# Patient Record
Sex: FEMALE | URBAN - METROPOLITAN AREA
[De-identification: names, ages, dates, MRNs, and addresses within clinical notes are randomized per-mention and may not be internally consistent; named-entity substitution may affect disease eponyms.]

---

## 2019-07-10 ENCOUNTER — APPOINTMENT (RX ONLY)
Dept: URBAN - METROPOLITAN AREA CLINIC 4 | Facility: CLINIC | Age: 74
Setting detail: DERMATOLOGY
End: 2019-07-10

## 2019-07-10 DIAGNOSIS — L82.1 OTHER SEBORRHEIC KERATOSIS: ICD-10-CM

## 2019-07-10 DIAGNOSIS — L81.4 OTHER MELANIN HYPERPIGMENTATION: ICD-10-CM

## 2019-07-10 DIAGNOSIS — Z71.89 OTHER SPECIFIED COUNSELING: ICD-10-CM

## 2019-07-10 DIAGNOSIS — D22 MELANOCYTIC NEVI: ICD-10-CM

## 2019-07-10 DIAGNOSIS — D18.0 HEMANGIOMA: ICD-10-CM

## 2019-07-10 PROBLEM — D18.01 HEMANGIOMA OF SKIN AND SUBCUTANEOUS TISSUE: Status: ACTIVE | Noted: 2019-07-10

## 2019-07-10 PROBLEM — D22.39 MELANOCYTIC NEVI OF OTHER PARTS OF FACE: Status: ACTIVE | Noted: 2019-07-10

## 2019-07-10 PROBLEM — D22.62 MELANOCYTIC NEVI OF LEFT UPPER LIMB, INCLUDING SHOULDER: Status: ACTIVE | Noted: 2019-07-10

## 2019-07-10 PROBLEM — D22.71 MELANOCYTIC NEVI OF RIGHT LOWER LIMB, INCLUDING HIP: Status: ACTIVE | Noted: 2019-07-10

## 2019-07-10 PROBLEM — D22.61 MELANOCYTIC NEVI OF RIGHT UPPER LIMB, INCLUDING SHOULDER: Status: ACTIVE | Noted: 2019-07-10

## 2019-07-10 PROBLEM — D22.5 MELANOCYTIC NEVI OF TRUNK: Status: ACTIVE | Noted: 2019-07-10

## 2019-07-10 PROBLEM — D22.72 MELANOCYTIC NEVI OF LEFT LOWER LIMB, INCLUDING HIP: Status: ACTIVE | Noted: 2019-07-10

## 2019-07-10 PROCEDURE — ? ADDITIONAL NOTES

## 2019-07-10 PROCEDURE — 99203 OFFICE O/P NEW LOW 30 MIN: CPT

## 2019-07-10 PROCEDURE — ? COUNSELING

## 2019-07-10 ASSESSMENT — LOCATION SIMPLE DESCRIPTION DERM
LOCATION SIMPLE: LEFT UPPER ARM
LOCATION SIMPLE: LEFT BREAST
LOCATION SIMPLE: UPPER BACK
LOCATION SIMPLE: RIGHT THIGH
LOCATION SIMPLE: LEFT UPPER BACK
LOCATION SIMPLE: LEFT TEMPLE
LOCATION SIMPLE: LEFT THIGH
LOCATION SIMPLE: LEFT FOREHEAD
LOCATION SIMPLE: CHEST
LOCATION SIMPLE: ABDOMEN
LOCATION SIMPLE: RIGHT UPPER ARM
LOCATION SIMPLE: RIGHT POSTERIOR THIGH

## 2019-07-10 ASSESSMENT — LOCATION DETAILED DESCRIPTION DERM
LOCATION DETAILED: LEFT LATERAL BREAST 3-4:00 REGION
LOCATION DETAILED: RIGHT ANTERIOR PROXIMAL UPPER ARM
LOCATION DETAILED: RIGHT ANTERIOR DISTAL UPPER ARM
LOCATION DETAILED: LEFT ANTERIOR PROXIMAL THIGH
LOCATION DETAILED: UPPER STERNUM
LOCATION DETAILED: LEFT MEDIAL FOREHEAD
LOCATION DETAILED: LEFT MEDIAL UPPER BACK
LOCATION DETAILED: LEFT SUPERIOR MEDIAL UPPER BACK
LOCATION DETAILED: RIGHT DISTAL POSTERIOR THIGH
LOCATION DETAILED: LOWER STERNUM
LOCATION DETAILED: LEFT CENTRAL TEMPLE
LOCATION DETAILED: INFERIOR THORACIC SPINE
LOCATION DETAILED: EPIGASTRIC SKIN
LOCATION DETAILED: RIGHT ANTERIOR DISTAL THIGH
LOCATION DETAILED: RIGHT ANTERIOR PROXIMAL THIGH
LOCATION DETAILED: MIDDLE STERNUM
LOCATION DETAILED: LEFT ANTERIOR PROXIMAL UPPER ARM
LOCATION DETAILED: SUPERIOR THORACIC SPINE
LOCATION DETAILED: LEFT ANTERIOR DISTAL THIGH
LOCATION DETAILED: LEFT ANTERIOR DISTAL UPPER ARM
LOCATION DETAILED: LEFT INFERIOR MEDIAL FOREHEAD

## 2019-07-10 ASSESSMENT — LOCATION ZONE DERM
LOCATION ZONE: TRUNK
LOCATION ZONE: ARM
LOCATION ZONE: FACE
LOCATION ZONE: LEG

## 2019-07-10 NOTE — PROCEDURE: ADDITIONAL NOTES
Detail Level: Zone
Additional Notes: Lesion of concern on the left cheek, clinically consistent with a seborrheic keratosis.
Additional Notes: Lesion of concern on the left breast, clinically consistent with a seborrheic keratosis.
Additional Notes: Lesion of concern on the leg, clinically consistent with a seborrheic keratosis.

## 2021-01-15 DIAGNOSIS — Z23 NEED FOR VACCINATION: ICD-10-CM

## 2022-09-12 ENCOUNTER — APPOINTMENT (RX ONLY)
Dept: URBAN - METROPOLITAN AREA CLINIC 4 | Facility: CLINIC | Age: 77
Setting detail: DERMATOLOGY
End: 2022-09-12

## 2022-09-12 DIAGNOSIS — L81.4 OTHER MELANIN HYPERPIGMENTATION: ICD-10-CM

## 2022-09-12 DIAGNOSIS — L72.0 EPIDERMAL CYST: ICD-10-CM

## 2022-09-12 DIAGNOSIS — L91.8 OTHER HYPERTROPHIC DISORDERS OF THE SKIN: ICD-10-CM

## 2022-09-12 DIAGNOSIS — L57.0 ACTINIC KERATOSIS: ICD-10-CM

## 2022-09-12 DIAGNOSIS — Z71.89 OTHER SPECIFIED COUNSELING: ICD-10-CM

## 2022-09-12 PROBLEM — D48.5 NEOPLASM OF UNCERTAIN BEHAVIOR OF SKIN: Status: ACTIVE | Noted: 2022-09-12

## 2022-09-12 PROCEDURE — ? SKIN TAG REMOVAL (COSMETIC)

## 2022-09-12 PROCEDURE — ? OBSERVATION

## 2022-09-12 PROCEDURE — ? BIOPSY BY SHAVE METHOD

## 2022-09-12 PROCEDURE — ? PHOTO-DOCUMENTATION

## 2022-09-12 PROCEDURE — 11102 TANGNTL BX SKIN SINGLE LES: CPT

## 2022-09-12 PROCEDURE — 99203 OFFICE O/P NEW LOW 30 MIN: CPT | Mod: 25

## 2022-09-12 PROCEDURE — ? EXTRACTIONS

## 2022-09-12 PROCEDURE — ? COUNSELING

## 2022-09-12 ASSESSMENT — LOCATION DETAILED DESCRIPTION DERM
LOCATION DETAILED: LEFT INFERIOR MEDIAL MALAR CHEEK
LOCATION DETAILED: RIGHT AXILLARY VAULT
LOCATION DETAILED: RIGHT CENTRAL ZYGOMA
LOCATION DETAILED: LEFT SUPERIOR MEDIAL FOREHEAD
LOCATION DETAILED: LEFT MEDIAL BREAST 9-10:00 REGION

## 2022-09-12 ASSESSMENT — LOCATION SIMPLE DESCRIPTION DERM
LOCATION SIMPLE: LEFT BREAST
LOCATION SIMPLE: RIGHT AXILLARY VAULT
LOCATION SIMPLE: RIGHT ZYGOMA
LOCATION SIMPLE: LEFT FOREHEAD
LOCATION SIMPLE: LEFT CHEEK

## 2022-09-12 ASSESSMENT — LOCATION ZONE DERM
LOCATION ZONE: FACE
LOCATION ZONE: AXILLAE
LOCATION ZONE: TRUNK

## 2022-09-12 NOTE — PROCEDURE: SKIN TAG REMOVAL (COSMETIC)
Removed With: gradle excision
Detail Level: Detailed
Anesthesia Volume In Cc: 3
Anesthesia Type: 1% lidocaine with epinephrine
Price (Use Numbers Only, No Special Characters Or $): 0
Consent: Written consent obtained and the risks of skin tag removal was reviewed with the patient including but not limited to bleeding, pigmentary change, infection, pain, and remote possibility of scarring.

## 2022-09-12 NOTE — PROCEDURE: EXTRACTIONS
Render Post-Care Instructions In Note?: no
Extraction Method: 11 blade and comedo extractor
Post-Care Instructions: I reviewed with the patient in detail post-care instructions. Patient is to keep the treatment areas dry overnight, and then apply bacitracin twice daily until healed. Patient may apply hydrogen peroxide soaks to remove any crusting.
Acne Type: Comedonal Lesions
Prep Text (Optional): Prior to removal the treatment areas were prepped in the usual fashion.
Detail Level: Detailed
Consent was obtained and risks were reviewed including but not limited to scarring, infection, bleeding, scabbing, incomplete removal, and allergy to anesthesia.

## 2022-09-12 NOTE — PROCEDURE: MIPS QUALITY
Quality 111:Pneumonia Vaccination Status For Older Adults: Pneumococcal vaccine (PPSV23) administered on or after patient’s 60th birthday and before the end of the measurement period
Detail Level: Detailed
Quality 130: Documentation Of Current Medications In The Medical Record: Current Medications Documented
Quality 110: Preventive Care And Screening: Influenza Immunization: Influenza Immunization Administered during Influenza season
Quality 431: Preventive Care And Screening: Unhealthy Alcohol Use - Screening: Patient not identified as an unhealthy alcohol user when screened for unhealthy alcohol use using a systematic screening method
Quality 226: Preventive Care And Screening: Tobacco Use: Screening And Cessation Intervention: Patient screened for tobacco use and is an ex/non-smoker

## 2022-09-26 ENCOUNTER — APPOINTMENT (RX ONLY)
Dept: URBAN - METROPOLITAN AREA CLINIC 4 | Facility: CLINIC | Age: 77
Setting detail: DERMATOLOGY
End: 2022-09-26

## 2022-09-26 DIAGNOSIS — L57.0 ACTINIC KERATOSIS: ICD-10-CM

## 2022-09-26 PROCEDURE — ? LIQUID NITROGEN

## 2022-09-26 PROCEDURE — ? ADDITIONAL NOTES

## 2022-09-26 PROCEDURE — 17000 DESTRUCT PREMALG LESION: CPT

## 2022-09-26 ASSESSMENT — LOCATION DETAILED DESCRIPTION DERM: LOCATION DETAILED: RIGHT CENTRAL ZYGOMA

## 2022-09-26 ASSESSMENT — LOCATION ZONE DERM: LOCATION ZONE: FACE

## 2022-09-26 ASSESSMENT — LOCATION SIMPLE DESCRIPTION DERM: LOCATION SIMPLE: RIGHT ZYGOMA

## 2022-09-26 NOTE — PROCEDURE: LIQUID NITROGEN
Consent: The patient's consent was obtained including but not limited to risks of crusting, scabbing, blistering, scarring, darker or lighter pigmentary change, recurrence, incomplete removal and infection.
Show Aperture Variable?: Yes
Render Note In Bullet Format When Appropriate: No
Post-Care Instructions: I reviewed with the patient in detail post-care instructions. Patient is to wear sunprotection, and avoid picking at any of the treated lesions. Pt may apply Vaseline to crusted or scabbing areas.
Duration Of Freeze Thaw-Cycle (Seconds): 0
Number Of Freeze-Thaw Cycles: 3 freeze-thaw cycles
Detail Level: Detailed

## 2022-09-26 NOTE — PROCEDURE: ADDITIONAL NOTES
Detail Level: Detailed
Render Risk Assessment In Note?: no
Additional Notes: Hypertrophic Ak per path [id number] from 9/12/22.

## 2023-05-02 ENCOUNTER — APPOINTMENT (RX ONLY)
Dept: URBAN - METROPOLITAN AREA CLINIC 4 | Facility: CLINIC | Age: 78
Setting detail: DERMATOLOGY
End: 2023-05-02

## 2023-05-02 DIAGNOSIS — L72.8 OTHER FOLLICULAR CYSTS OF THE SKIN AND SUBCUTANEOUS TISSUE: ICD-10-CM

## 2023-05-02 DIAGNOSIS — L57.0 ACTINIC KERATOSIS: ICD-10-CM

## 2023-05-02 DIAGNOSIS — L82.0 INFLAMED SEBORRHEIC KERATOSIS: ICD-10-CM

## 2023-05-02 PROCEDURE — ? DIAGNOSIS COMMENT

## 2023-05-02 PROCEDURE — 99213 OFFICE O/P EST LOW 20 MIN: CPT | Mod: 25

## 2023-05-02 PROCEDURE — ? LIQUID NITROGEN

## 2023-05-02 PROCEDURE — ? COUNSELING

## 2023-05-02 PROCEDURE — 17110 DESTRUCTION B9 LES UP TO 14: CPT

## 2023-05-02 ASSESSMENT — LOCATION ZONE DERM
LOCATION ZONE: FACE
LOCATION ZONE: SCALP

## 2023-05-02 ASSESSMENT — LOCATION DETAILED DESCRIPTION DERM
LOCATION DETAILED: LEFT INFERIOR CENTRAL MALAR CHEEK
LOCATION DETAILED: LEFT SUPERIOR LATERAL MALAR CHEEK
LOCATION DETAILED: LEFT MEDIAL FOREHEAD
LOCATION DETAILED: RIGHT INFERIOR OCCIPITAL SCALP
LOCATION DETAILED: RIGHT LATERAL FOREHEAD

## 2023-05-02 ASSESSMENT — LOCATION SIMPLE DESCRIPTION DERM
LOCATION SIMPLE: RIGHT FOREHEAD
LOCATION SIMPLE: LEFT CHEEK
LOCATION SIMPLE: POSTERIOR SCALP
LOCATION SIMPLE: LEFT FOREHEAD

## 2023-05-02 NOTE — PROCEDURE: MIPS QUALITY
Quality 226: Preventive Care And Screening: Tobacco Use: Screening And Cessation Intervention: Patient screened for tobacco use and is an ex/non-smoker
Quality 130: Documentation Of Current Medications In The Medical Record: Current Medications Documented
Detail Level: Detailed
Quality 111:Pneumonia Vaccination Status For Older Adults: Patient received any pneumococcal conjugate or polysaccharide vaccine on or after their 60th birthday and before the end of the measurement period

## 2023-05-02 NOTE — PROCEDURE: DIAGNOSIS COMMENT
Render Risk Assessment In Note?: no
Detail Level: Simple
Comment: Discussed field treatment with 5FU+calcipotriene. Patient will be working outside until fall. Recommended we consider field treatment in fall.

## 2023-05-02 NOTE — PROCEDURE: LIQUID NITROGEN
Consent: The patient's consent was obtained including but not limited to risks of crusting, scabbing, blistering, scarring, darker or lighter pigmentary change, recurrence, incomplete removal and infection.
Spray Paint Technique: No
Medical Necessity Information: It is in your best interest to select a reason for this procedure from the list below. All of these items fulfill various CMS LCD requirements except the new and changing color options.
Post-Care Instructions: I reviewed with the patient in detail post-care instructions. Patient is to wear sunprotection, and avoid picking at any of the treated lesions. Pt may apply Vaseline to crusted or scabbing areas.
Show Topical Anesthesia Variable?: Yes
Detail Level: Detailed
Medical Necessity Clause: This procedure was medically necessary because the lesions that were treated were:
Spray Paint Text: The liquid nitrogen was applied to the skin utilizing a spray paint frosting technique.

## 2023-10-24 ENCOUNTER — APPOINTMENT (RX ONLY)
Dept: URBAN - METROPOLITAN AREA CLINIC 4 | Facility: CLINIC | Age: 78
Setting detail: DERMATOLOGY
End: 2023-10-24

## 2023-10-24 DIAGNOSIS — L85.3 XEROSIS CUTIS: ICD-10-CM

## 2023-10-24 DIAGNOSIS — L81.4 OTHER MELANIN HYPERPIGMENTATION: ICD-10-CM

## 2023-10-24 DIAGNOSIS — L82.1 OTHER SEBORRHEIC KERATOSIS: ICD-10-CM

## 2023-10-24 DIAGNOSIS — D18.0 HEMANGIOMA: ICD-10-CM

## 2023-10-24 PROBLEM — D18.01 HEMANGIOMA OF SKIN AND SUBCUTANEOUS TISSUE: Status: ACTIVE | Noted: 2023-10-24

## 2023-10-24 PROCEDURE — ? COUNSELING

## 2023-10-24 PROCEDURE — 99213 OFFICE O/P EST LOW 20 MIN: CPT

## 2023-10-24 PROCEDURE — ? ADDITIONAL NOTES

## 2023-10-24 ASSESSMENT — LOCATION DETAILED DESCRIPTION DERM: LOCATION DETAILED: RIGHT SUPERIOR MEDIAL UPPER BACK

## 2023-10-24 ASSESSMENT — LOCATION ZONE DERM: LOCATION ZONE: TRUNK

## 2023-10-24 ASSESSMENT — LOCATION SIMPLE DESCRIPTION DERM: LOCATION SIMPLE: RIGHT UPPER BACK

## 2023-10-24 NOTE — PROCEDURE: ADDITIONAL NOTES
Additional Notes: Recommend amlactin lotion
Render Risk Assessment In Note?: no
Detail Level: Detailed

## 2023-10-24 NOTE — PROCEDURE: COUNSELING
Detail Level: Generalized
Sunscreen Recommendations: Recommend sunscreen daily, or sun protective clothing.  I recommend a zinc or titanium based sunscreen with a spf of 30 or greater.
Moisturizer Recommendations: Cerave Cream

## 2024-10-29 ENCOUNTER — APPOINTMENT (RX ONLY)
Dept: URBAN - METROPOLITAN AREA CLINIC 4 | Facility: CLINIC | Age: 79
Setting detail: DERMATOLOGY
End: 2024-10-29

## 2024-10-29 DIAGNOSIS — L57.0 ACTINIC KERATOSIS: ICD-10-CM

## 2024-10-29 DIAGNOSIS — L85.3 XEROSIS CUTIS: ICD-10-CM

## 2024-10-29 DIAGNOSIS — Z71.89 OTHER SPECIFIED COUNSELING: ICD-10-CM

## 2024-10-29 DIAGNOSIS — D22 MELANOCYTIC NEVI: ICD-10-CM

## 2024-10-29 DIAGNOSIS — D18.0 HEMANGIOMA: ICD-10-CM

## 2024-10-29 DIAGNOSIS — L82.1 OTHER SEBORRHEIC KERATOSIS: ICD-10-CM

## 2024-10-29 DIAGNOSIS — L81.4 OTHER MELANIN HYPERPIGMENTATION: ICD-10-CM

## 2024-10-29 PROBLEM — D22.5 MELANOCYTIC NEVI OF TRUNK: Status: ACTIVE | Noted: 2024-10-29

## 2024-10-29 PROBLEM — D18.01 HEMANGIOMA OF SKIN AND SUBCUTANEOUS TISSUE: Status: ACTIVE | Noted: 2024-10-29

## 2024-10-29 PROCEDURE — ? LIQUID NITROGEN

## 2024-10-29 PROCEDURE — 99213 OFFICE O/P EST LOW 20 MIN: CPT | Mod: 25

## 2024-10-29 PROCEDURE — 17000 DESTRUCT PREMALG LESION: CPT

## 2024-10-29 PROCEDURE — 17003 DESTRUCT PREMALG LES 2-14: CPT

## 2024-10-29 PROCEDURE — ? COUNSELING

## 2024-10-29 ASSESSMENT — LOCATION ZONE DERM
LOCATION ZONE: FACE
LOCATION ZONE: TRUNK

## 2024-10-29 ASSESSMENT — LOCATION DETAILED DESCRIPTION DERM
LOCATION DETAILED: UPPER STERNUM
LOCATION DETAILED: MIDDLE STERNUM
LOCATION DETAILED: RIGHT MEDIAL UPPER BACK
LOCATION DETAILED: LEFT MEDIAL FOREHEAD
LOCATION DETAILED: LEFT MEDIAL SUPERIOR CHEST
LOCATION DETAILED: RIGHT SUPERIOR MEDIAL UPPER BACK
LOCATION DETAILED: RIGHT FOREHEAD

## 2024-10-29 ASSESSMENT — LOCATION SIMPLE DESCRIPTION DERM
LOCATION SIMPLE: LEFT FOREHEAD
LOCATION SIMPLE: RIGHT FOREHEAD
LOCATION SIMPLE: CHEST
LOCATION SIMPLE: RIGHT UPPER BACK

## 2024-10-29 NOTE — PROCEDURE: LIQUID NITROGEN
Render Note In Bullet Format When Appropriate: No
Post-Care Instructions: I reviewed with the patient in detail post-care instructions. Patient is to wear sunprotection, and avoid picking at any of the treated lesions. Pt may apply Vaseline to crusted or scabbing areas.
Number Of Freeze-Thaw Cycles: 2 freeze-thaw cycles
Show Aperture Variable?: Yes
Consent: The patient's consent was obtained including but not limited to risks of crusting, scabbing, blistering, scarring, darker or lighter pigmentary change, recurrence, incomplete removal and infection.
Detail Level: Detailed
Duration Of Freeze Thaw-Cycle (Seconds): 2